# Patient Record
Sex: FEMALE | Race: WHITE | NOT HISPANIC OR LATINO | Employment: OTHER | URBAN - METROPOLITAN AREA
[De-identification: names, ages, dates, MRNs, and addresses within clinical notes are randomized per-mention and may not be internally consistent; named-entity substitution may affect disease eponyms.]

---

## 2018-04-24 ENCOUNTER — TELEPHONE (OUTPATIENT)
Dept: PEDIATRICS CLINIC | Facility: CLINIC | Age: 18
End: 2018-04-24

## 2018-04-27 RX ORDER — ACETAMINOPHEN 650 MG/1
1 SUPPOSITORY RECTAL
COMMUNITY
Start: 2015-04-27 | End: 2018-04-30 | Stop reason: SDUPTHER

## 2018-04-30 ENCOUNTER — TELEPHONE (OUTPATIENT)
Dept: INTERNAL MEDICINE CLINIC | Facility: CLINIC | Age: 18
End: 2018-04-30

## 2018-04-30 ENCOUNTER — OFFICE VISIT (OUTPATIENT)
Dept: INTERNAL MEDICINE CLINIC | Facility: CLINIC | Age: 18
End: 2018-04-30
Payer: COMMERCIAL

## 2018-04-30 VITALS — HEIGHT: 65 IN | BODY MASS INDEX: 26.15 KG/M2 | WEIGHT: 156.97 LBS

## 2018-04-30 DIAGNOSIS — Z23 NEED FOR INFLUENZA VACCINATION: Primary | ICD-10-CM

## 2018-04-30 DIAGNOSIS — Z23 NEED FOR TDAP VACCINATION: ICD-10-CM

## 2018-04-30 DIAGNOSIS — R50.83 POST-VACCINATION FEVER: ICD-10-CM

## 2018-04-30 DIAGNOSIS — J30.1 SEASONAL ALLERGIC RHINITIS DUE TO POLLEN: ICD-10-CM

## 2018-04-30 DIAGNOSIS — F84.0 AUTISTIC DISORDER: ICD-10-CM

## 2018-04-30 PROCEDURE — 3008F BODY MASS INDEX DOCD: CPT | Performed by: PHYSICIAN ASSISTANT

## 2018-04-30 PROCEDURE — 1036F TOBACCO NON-USER: CPT | Performed by: PHYSICIAN ASSISTANT

## 2018-04-30 PROCEDURE — 99214 OFFICE O/P EST MOD 30 MIN: CPT | Performed by: PHYSICIAN ASSISTANT

## 2018-04-30 PROCEDURE — 90471 IMMUNIZATION ADMIN: CPT | Performed by: PHYSICIAN ASSISTANT

## 2018-04-30 PROCEDURE — 3725F SCREEN DEPRESSION PERFORMED: CPT | Performed by: PHYSICIAN ASSISTANT

## 2018-04-30 PROCEDURE — 90715 TDAP VACCINE 7 YRS/> IM: CPT | Performed by: PHYSICIAN ASSISTANT

## 2018-04-30 RX ORDER — ACETAMINOPHEN 650 MG/1
650 SUPPOSITORY RECTAL EVERY 4 HOURS PRN
Qty: 24 SUPPOSITORY | Refills: 1 | Status: SHIPPED | OUTPATIENT
Start: 2018-04-30 | End: 2018-05-20

## 2018-04-30 NOTE — PROGRESS NOTES
Assessment/Plan:  Tdap was given today  Tylenol suppository provided for any post vaccine fever, if any fever or change in behavior go to ER for eval as seizures were fever related  [employer] forms completed and given to you at visit  Can bring in disability parking form and I will complete  As per your discussion with staff you will obtain a healthcare POA and bring to office so can be put into patients chart  No problem-specific Assessment & Plan notes found for this encounter  Diagnoses and all orders for this visit:    Need for influenza vaccination    Need for Tdap vaccination  -     TDAP VACCINE GREATER THAN OR EQUAL TO 8YO IM    Autistic disorder    Seasonal allergic rhinitis due to pollen  -     loratadine (CLARITIN REDITABS) 10 MG dissolvable tablet; Take 1 tablet (10 mg total) by mouth daily for 90 days    Post-vaccination fever  -     acetaminophen (TYLENOL) 650 mg suppository; Insert 1 suppository (650 mg total) into the rectum every 4 (four) hours as needed for moderate pain or fever for up to 20 days    Other orders  -     Discontinue: acetaminophen (TYLENOL) 650 mg suppository; Insert 1 suppository into the rectum  -     Cancel: TDAP VACCINE GREATER THAN OR EQUAL TO 8YO IM  -     Cancel: Flu vaccine greater than or equal to 4yo preservative free IM          Subjective:      Patient ID: Chuck Wong is a 19 y o  female  Pt is a 19 y o  Female who presents to the office with mom and grandma to establish care  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, softball, and cheer  The pt is also on a special needs softball league- challenger league  Patient is a full time student currently, will be through 22years old  Plan is for a day program after- they are currently on a waiting list      The patient's last sz was in 2010, she is not on any medications currently  She was on Keppra for 2 years after her last seizure   No medications since that time and she has had no further seizures  She followed up with neuro after weaning off the Keppra, they last saw her 3 years ago  Were instructed to follow up as needed or if there were any further concerns  The patient's mother is unsure of when her last Tetanus shot was  Discussed with mom that our record shows her last was in [address]  Mom is concerned about pt having a sz after shots when she gets a fever after  Mom is concerned that she needs to have suppository for fever control  She is interested the in the patient having the immunization though  Interested in getting a handicap placard- park closer to areas so that less walking in parking lots as the patient has a tendency to dart off and they would like to have less walking through the parking lot  Mom believes that the patient has seasonal allergies  She does not take any medications for her allergies  We discussed can purchase the melt away if needs allergy meds [place]    Denies asthma or any cardiac problems  Pt had some dental extractions as a child  No other surgeries  NKDA  Mother reports eats well, sleeping well most of the time  Does not follow with any MH providers  Remains active with softball  Requires assistance to dress and bath self  After extensive discussion between mother and the grandmother it was decided they wanted the Tdap for patient as well as mothers brother having a baby and daily contact as they live down the street  Multiple attempts made for blood pressure but could not be obtained  Patient did struggle at first but vaccine was given without incident    They inquired about PPD but as not required at this time not completed and would be easier for TB gold blood test          The following portions of the patient's history were reviewed and updated as appropriate: allergies, current medications, past family history, past medical history, past social history, past surgical history and problem list     Review of Systems   Constitutional: Negative for fever and unexpected weight change  HENT: Positive for rhinorrhea and sneezing  Eyes: Negative  Respiratory: Negative  Negative for cough and wheezing  Gastrointestinal: Negative for constipation, diarrhea and vomiting  Genitourinary: Negative for frequency and hematuria  Musculoskeletal: Negative for gait problem  Allergic/Immunologic: Positive for environmental allergies  Objective:      Ht 5' 5" (1 651 m)   Wt 71 2 kg (156 lb 15 5 oz)   BMI 26 12 kg/m²          Physical Exam   Constitutional: She appears well-developed and well-nourished  No distress  HENT:   Head: Normocephalic and atraumatic  Right Ear: Tympanic membrane and external ear normal    Left Ear: Tympanic membrane and external ear normal    Nose: Rhinorrhea present  Mouth/Throat: Uvula is midline, oropharynx is clear and moist and mucous membranes are normal  No trismus in the jaw  No oropharyngeal exudate or posterior oropharyngeal erythema  Mild BL cerumen without impaction  Eyes: Conjunctivae are normal  No scleral icterus  Neck: Neck supple  Cardiovascular: Normal rate, regular rhythm, S1 normal, S2 normal and normal heart sounds  Exam reveals no gallop, no distant heart sounds and no friction rub  No murmur heard  Pulmonary/Chest: Effort normal and breath sounds normal  No accessory muscle usage  No respiratory distress  Limited by patient's cooperation in taking deep breaths  Abdominal: Soft  Bowel sounds are normal  There is no tenderness  There is no guarding  Neurological: She is alert  Gait normal    Patient is non-verbal at baseline  Communicates with some sign language  Skin: Skin is warm and dry  Psychiatric: She is hyperactive  She is not aggressive and not combative  She is noncommunicative (Patient is non-verbal  Communicates with some sign language and gestures  Verbalizes sounds  )

## 2018-05-01 NOTE — TELEPHONE ENCOUNTER
This form was completed during the visit  The original was given to the mother at the end of the visit  A photocopy of this form is in the blue clinical folder and just needs to be sent for scanning

## 2018-05-02 NOTE — TELEPHONE ENCOUNTER
Left message for mom/Berta letting her know form has been completed and will be at the  for her to   Form was also sent to centralized faxing to scan into chart

## 2019-03-08 ENCOUNTER — TELEPHONE (OUTPATIENT)
Dept: INTERNAL MEDICINE CLINIC | Facility: CLINIC | Age: 19
End: 2019-03-08

## 2019-03-08 ENCOUNTER — OFFICE VISIT (OUTPATIENT)
Dept: INTERNAL MEDICINE CLINIC | Facility: CLINIC | Age: 19
End: 2019-03-08

## 2019-03-08 VITALS — BODY MASS INDEX: 27.47 KG/M2 | WEIGHT: 164.9 LBS | HEIGHT: 65 IN

## 2019-03-08 DIAGNOSIS — J30.1 SEASONAL ALLERGIC RHINITIS DUE TO POLLEN: ICD-10-CM

## 2019-03-08 DIAGNOSIS — F41.9 ANXIOUSNESS: ICD-10-CM

## 2019-03-08 DIAGNOSIS — F84.0 AUTISTIC DISORDER: Primary | ICD-10-CM

## 2019-03-08 PROBLEM — R50.83 POST-VACCINATION FEVER: Status: RESOLVED | Noted: 2018-04-30 | Resolved: 2019-03-08

## 2019-03-08 PROBLEM — Z23 NEED FOR TDAP VACCINATION: Status: RESOLVED | Noted: 2018-04-30 | Resolved: 2019-03-08

## 2019-03-08 PROCEDURE — 99214 OFFICE O/P EST MOD 30 MIN: CPT | Performed by: INTERNAL MEDICINE

## 2019-03-08 NOTE — PATIENT INSTRUCTIONS
We will fax the forms  And mail original to home  Mother will provide the fax number and location to wear the MA 51 form needs to be sent  We advised her we will attempt to send this for her by Monday March 11th we will advise her when it has been faxed and will mail the original to her  Mother is aware that she can purchase over-the-counter allergy medications should her daughter demonstrate allergy symptoms again this year  Aware to contact our office should her daughter have any recurrence of seizures for evaluation and referral to Neurology
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
## 2019-03-11 PROBLEM — F79 INTELLECTUAL DISABILITY: Status: ACTIVE | Noted: 2019-03-11

## 2019-03-11 NOTE — TELEPHONE ENCOUNTER
Form copied (placed in scanning folder), faxed (confirmation Received), original mailed to mom  Mom contacted and made aware

## 2019-03-25 ENCOUNTER — TRANSITIONAL CARE MANAGEMENT (OUTPATIENT)
Dept: INTERNAL MEDICINE CLINIC | Facility: CLINIC | Age: 19
End: 2019-03-25

## 2019-03-25 ENCOUNTER — TELEPHONE (OUTPATIENT)
Dept: INTERNAL MEDICINE CLINIC | Facility: CLINIC | Age: 19
End: 2019-03-25

## 2019-03-25 DIAGNOSIS — F84.0 AUTISTIC DISORDER: ICD-10-CM

## 2019-03-25 DIAGNOSIS — F41.9 ANXIOUSNESS: Primary | ICD-10-CM

## 2019-03-25 RX ORDER — SERTRALINE HYDROCHLORIDE 25 MG/1
25 TABLET, FILM COATED ORAL DAILY
Qty: 30 TABLET | Refills: 0 | Status: SHIPPED | OUTPATIENT
Start: 2019-03-25 | End: 2019-07-03

## 2019-03-25 NOTE — TELEPHONE ENCOUNTER
Mom called back  They really want Rosina to be seen by Suzy Verma today if possible  They will wait for a call back

## 2019-03-25 NOTE — TELEPHONE ENCOUNTER
PTS  MOM CALLED UPSET BECAUSE SHE TOOK PT  OUT SHOPPING TODAY AND SHE BECAME VERY UPSET AND OUT OF CONTROL  SHE BIT HER GRANDMOTHER AND MOM IS ASKING FOR SOME TYPE OF MEDICATION TO CALM HER DOWN AND SHE WANTS TO START IT TODAY  SHE WANTS AN APPT  WITH YOU TODAY, I ADVISED YOU DO NOT HAVE ANY AVAILABLE APPTS  BUT THAT I WOULD SPEAK TO YOU AND SEE WHAT YOU WANTED TO DO   PLEASE ADVISE    GRANDMOTHER ALSO ON THE PHONE AND SHE WAS ASKING ABOUT STARTING ON CLONIDINE

## 2019-04-21 DIAGNOSIS — F84.0 AUTISTIC DISORDER: ICD-10-CM

## 2019-04-21 DIAGNOSIS — F41.9 ANXIOUSNESS: ICD-10-CM

## 2019-04-22 RX ORDER — SERTRALINE HYDROCHLORIDE 25 MG/1
25 TABLET, FILM COATED ORAL DAILY
Qty: 30 TABLET | Refills: 0 | OUTPATIENT
Start: 2019-04-22 | End: 2019-05-22

## 2019-07-03 ENCOUNTER — OFFICE VISIT (OUTPATIENT)
Dept: INTERNAL MEDICINE CLINIC | Facility: CLINIC | Age: 19
End: 2019-07-03

## 2019-07-03 ENCOUNTER — TELEPHONE (OUTPATIENT)
Dept: INTERNAL MEDICINE CLINIC | Facility: CLINIC | Age: 19
End: 2019-07-03

## 2019-07-03 ENCOUNTER — APPOINTMENT (OUTPATIENT)
Dept: RADIOLOGY | Facility: MEDICAL CENTER | Age: 19
End: 2019-07-03
Payer: COMMERCIAL

## 2019-07-03 VITALS — HEIGHT: 65 IN | BODY MASS INDEX: 28.58 KG/M2 | WEIGHT: 171.52 LBS

## 2019-07-03 DIAGNOSIS — L98.9 SKIN LESION OF FACE: ICD-10-CM

## 2019-07-03 DIAGNOSIS — F84.0 AUTISTIC DISORDER: ICD-10-CM

## 2019-07-03 DIAGNOSIS — Z11.1 SCREENING-PULMONARY TB: ICD-10-CM

## 2019-07-03 DIAGNOSIS — F79 INTELLECTUAL DISABILITY: ICD-10-CM

## 2019-07-03 DIAGNOSIS — F41.9 ANXIOUSNESS: ICD-10-CM

## 2019-07-03 DIAGNOSIS — Z01.10 ENCOUNTER FOR HEARING EXAMINATION, UNSPECIFIED WHETHER ABNORMAL FINDINGS: ICD-10-CM

## 2019-07-03 DIAGNOSIS — Z00.00 ROUTINE ADULT HEALTH MAINTENANCE: Primary | ICD-10-CM

## 2019-07-03 PROCEDURE — 71046 X-RAY EXAM CHEST 2 VIEWS: CPT

## 2019-07-03 PROCEDURE — 99395 PREV VISIT EST AGE 18-39: CPT | Performed by: PHYSICIAN ASSISTANT

## 2019-07-03 NOTE — PROGRESS NOTES
Assessment/Plan:      Diagnoses and all orders for this visit:    Routine adult health maintenance    Intellectual disability    Autistic disorder    Anxiousness    Encounter for hearing examination, unspecified whether abnormal findings  -     Ambulatory referral to Audiology; Future    Skin lesion of face  -     Ambulatory referral to Plastic Surgery; Future    Screening-pulmonary TB  -     Cancel: TB Skin Test  -     XR chest pa & lateral; Future      49-year-old female presenting today with mom, current medical problems include autism, intellectual disability and anxiousness, here today for routine health maintenance visit and form/PPD for waiver form as mom states that she will be participating weekly in a day program     Patient's intellectual disability, autism and anxiousness were discussed today  She is currently on no medication  Mom declines psychiatry referral for anxiousness  She never took the sertraline that was prescribed back in May secondary to concern for side effects  Patient's mom states that she has been giving her CBD oil every morning on toast and has been seeming to work fairly well  At this time will continue to monitor  Physical performed with no other significant new abnormalities aside from a large skin lesion on the right side of the face near the temple anterior to the right ear  Uncertain if this is malignant or not, less likely suspect malignancy  However patient picks at it and it is scabbed and appears irritated  It is quite large, at least 1 cm in diameter  I will refer her to Plastic surgery as she may also need anesthesia if this is to be removed  Mom also concerned regarding hearing stating that she has been noticing that patient is more loud when she is seeing things or making noise  Unclear if this could be a hearing problem  I told mom I could certainly refer to audiology to assist with testing if needed      Age-appropriate education regarding screenings and prevention discussed  She has never been sexually active so I do not feel a Pap is necessary  Certainly a well-woman exam/internal exam for any woman would be important however we will defer at this time  Patient is up-to-date with vaccines, Tdap April 2018  Dental cleanings yearly at least also recommended and would need anesthesia for that  Mom declines any vision problems at this time  I do not see any need for blood work at this time  Healthy diet, regular activity recommended  PP D was attempted to be administered however patient not compliant and not allowing appropriate PPD to be placed  Mom called the waiver program and told them she can get a chest x-ray instead  I am uncertain how still she will be able to sit secondary to the autism but x-ray ordered and we will call with results  Chief Complaint   Patient presents with    Physical Exam     ANNUAL PHYSICAL EXAMINATION FORM for adult day program       Subjective:     Patient ID: Chuck Wong is a 22 y o  female  18y/o female here today with mom for physical and for waiver program      jeanna Berry has autism  Mom states she is concerned about anxiety, called before leaving for [employer] camp in [place] because it was worse  Was prescribed sertraline and mom states never started because one side effect was seizure  Hx of seizures, was on keppra but d/c'd and has remained seizure free  Mom states she has been trying CBD oil and has noticed some improvement, gives 3/4 in AM on PB and J in AM      She graduated high school - [employer]  She gets anesthesia foe dental cleanings but only had 1 dental cleaning  Mom states they use sonaacre brush twice a day  No vision correction  Mom states her vocal stemming is getting louder, so mom unsure about hearing  Also mole right ear she keeps picking at, thinks getting bigger  Mom states she is eating very well, 3 meals per day  Drinks water during day       Pt is not sexually active  LMP 6/25/19  She gets period once every 4-6 weeks  Cramping prior  Lasts 3-4 days, light bleeding  Mom states someone is with her all day  Currently at home with mother  Also under care of grandmother as needed  She will be participating in day program  Also participates in cheerleading  Review of Systems   Constitutional: Negative  HENT: Negative  Eyes: Negative  Respiratory: Negative  Cardiovascular: Negative  Gastrointestinal: Negative  Genitourinary: Negative  Musculoskeletal: Negative  Skin:        As in hpi   Neurological: Negative  Psychiatric/Behavioral:        As in HPI         The following portions of the patient's history were reviewed and updated as appropriate: allergies, current medications, past family history, past medical history, past social history, past surgical history and problem list       Objective:     Physical Exam   Constitutional: She appears well-developed  Mild overweight BMI 28 54   HENT:   Head: Normocephalic  Right Ear: Tympanic membrane, external ear and ear canal normal    Left Ear: Tympanic membrane, external ear and ear canal normal    Nose: Nose normal    Mouth/Throat: Oropharynx is clear and moist  Normal dentition  Eyes: Pupils are equal, round, and reactive to light  Conjunctivae and lids are normal    Neck: Normal range of motion  Neck supple  Normal carotid pulses present  Carotid bruit is not present  Cardiovascular: Normal rate, regular rhythm, normal heart sounds and normal pulses  Pulmonary/Chest: Effort normal and breath sounds normal    Abdominal: Soft  Normal appearance and bowel sounds are normal  There is no tenderness  Musculoskeletal:   Normal gait, gross normal movement of extremities and spine B/L   Lymphadenopathy:        Head (right side): No submandibular and no tonsillar adenopathy present  Head (left side): No submandibular and no tonsillar adenopathy present     Neurological: She is alert  She exhibits normal muscle tone  Coordination and gait normal    Skin:   Right temple just anterior to ear raised, rough textured skin lesion approx 1cm in diameter  Slightly scaly and scabbed   Psychiatric:   ID - mom historian  Pt unexpectedly loud at times, at times calm, inattentive  Does not respond to questions or communicate well  Vitals reviewed        Vitals:    07/03/19 0900   Weight: 77 8 kg (171 lb 8 3 oz)   Height: 5' 5" (1 651 m)

## 2019-07-08 NOTE — TELEPHONE ENCOUNTER
Please call pt mother - chest xray completed and negative  Copy provided with physical form  Form in red folder at nurse station  Please make copy before giving back to mother

## 2019-07-08 NOTE — TELEPHONE ENCOUNTER
Pt mom called and requested I fax over the H&P form and the chest x ray results to [phone]  This was done 3:47pm 7/8/19  Form scanned in media and mailed to patients mother

## 2019-07-08 NOTE — TELEPHONE ENCOUNTER
Called patient's mother, Fabiola gNuyen  No answer, left detailed message and to call us if she has questions

## 2019-07-09 ENCOUNTER — TELEPHONE (OUTPATIENT)
Dept: INTERNAL MEDICINE CLINIC | Facility: CLINIC | Age: 19
End: 2019-07-09

## 2019-07-09 NOTE — TELEPHONE ENCOUNTER
PTS  MOTHER CALLED ASKING FOR A LETTER STATING HER ADULT DAY PROGRAM CAN APPLY SUNSCREEN WHILE SHE IS AT THE PROGRAM  SHE NEEDS IT FAXED TO:    [address] [facility] PROGRAM: [phone]

## 2020-03-06 ENCOUNTER — TELEPHONE (OUTPATIENT)
Dept: INTERNAL MEDICINE CLINIC | Facility: CLINIC | Age: 20
End: 2020-03-06

## 2020-03-06 NOTE — TELEPHONE ENCOUNTER
Called patient mother because patient was scheduled for an physical with forms on 3/9/2020 at 9:00 am with Renay Rojas and she already have a physical exam done in 7/3/2019 with Clarke Pollock with forms too already scanned on media  Patient does not need another physical until 7/4/2020 because has to be one year plus one day  Made patient mom aware and she understood and if there is a form to be completed for physical she can just drop off the form and will take up to 10 business day to be completed and as soon is completed we will call her to inform her  Appt has been cancelled

## 2020-03-10 NOTE — TELEPHONE ENCOUNTER
Folder Color- Blue     Name of Form- General Physical Examination     Form to be filled out by- Lanette Gonsalez     Form to be Faxed ([phone] Att: Gunnar Vergara ) Mail order to mom     Patient made aware of 10 business day policy   Yes

## 2020-11-16 ENCOUNTER — TELEMEDICINE (OUTPATIENT)
Dept: INTERNAL MEDICINE CLINIC | Facility: CLINIC | Age: 20
End: 2020-11-16

## 2020-11-16 DIAGNOSIS — Z00.00 ENCOUNTER FOR ANNUAL HEALTH EXAMINATION: ICD-10-CM

## 2020-11-16 DIAGNOSIS — F84.0 AUTISTIC DISORDER: Primary | ICD-10-CM

## 2020-11-16 DIAGNOSIS — F81.89 DEVELOPMENTAL NON-VERBAL DISORDER: ICD-10-CM

## 2020-11-16 DIAGNOSIS — F79 INTELLECTUAL DISABILITY: ICD-10-CM

## 2020-11-16 PROCEDURE — 3725F SCREEN DEPRESSION PERFORMED: CPT | Performed by: PHYSICIAN ASSISTANT

## 2020-11-16 PROCEDURE — 1036F TOBACCO NON-USER: CPT | Performed by: PHYSICIAN ASSISTANT

## 2020-11-16 PROCEDURE — 99213 OFFICE O/P EST LOW 20 MIN: CPT | Performed by: PHYSICIAN ASSISTANT

## 2020-11-20 ENCOUNTER — TELEPHONE (OUTPATIENT)
Dept: INTERNAL MEDICINE CLINIC | Facility: CLINIC | Age: 20
End: 2020-11-20

## 2021-11-13 ENCOUNTER — IMMUNIZATIONS (OUTPATIENT)
Dept: FAMILY MEDICINE CLINIC | Facility: HOSPITAL | Age: 21
End: 2021-11-13

## 2021-11-13 DIAGNOSIS — Z23 ENCOUNTER FOR IMMUNIZATION: Primary | ICD-10-CM

## 2021-11-13 PROCEDURE — 91300 COVID-19 PFIZER VACC 0.3 ML: CPT

## 2021-11-13 PROCEDURE — 0001A COVID-19 PFIZER VACC 0.3 ML: CPT

## 2021-12-04 ENCOUNTER — IMMUNIZATIONS (OUTPATIENT)
Dept: FAMILY MEDICINE CLINIC | Facility: HOSPITAL | Age: 21
End: 2021-12-04

## 2021-12-04 DIAGNOSIS — Z23 ENCOUNTER FOR IMMUNIZATION: Primary | ICD-10-CM

## 2021-12-04 PROCEDURE — 91300 COVID-19 PFIZER VACC 0.3 ML: CPT

## 2021-12-04 PROCEDURE — 0002A COVID-19 PFIZER VACC 0.3 ML: CPT

## 2022-06-13 ENCOUNTER — HOSPITAL ENCOUNTER (EMERGENCY)
Facility: HOSPITAL | Age: 22
Discharge: HOME | End: 2022-06-14
Attending: EMERGENCY MEDICINE | Admitting: EMERGENCY MEDICINE
Payer: COMMERCIAL

## 2022-06-13 VITALS — WEIGHT: 171 LBS | RESPIRATION RATE: 18 BRPM | BODY MASS INDEX: 28.46 KG/M2
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
## 2022-06-13 PROCEDURE — 99282 EMERGENCY DEPT VISIT SF MDM: CPT

## 2022-06-13 PROCEDURE — 99284 EMERGENCY DEPT VISIT MOD MDM: CPT | Performed by: EMERGENCY MEDICINE

## 2022-06-14 RX ORDER — AMOXICILLIN AND CLAVULANATE POTASSIUM 875; 125 MG/1; MG/1
1 TABLET, FILM COATED ORAL ONCE
Status: COMPLETED | OUTPATIENT
Start: 2022-06-14 | End: 2022-06-14

## 2022-06-14 RX ORDER — AMOXICILLIN AND CLAVULANATE POTASSIUM 875; 125 MG/1; MG/1
1 TABLET, FILM COATED ORAL EVERY 12 HOURS SCHEDULED
Qty: 14 TABLET | Refills: 0 | Status: SHIPPED | OUTPATIENT
Start: 2022-06-14 | End: 2022-06-21

## 2022-06-14 RX ADMIN — AMOXICILLIN AND CLAVULANATE POTASSIUM 1 TABLET: 875; 125 TABLET, FILM COATED ORAL at 00:57

## 2022-06-14 NOTE — ED ATTENDING ATTESTATION
6/13/2022  I, Verba Goltz, MD, saw and evaluated the patient  I have discussed the patient with the resident/non-physician practitioner and agree with the resident's/non-physician practitioner's findings, Plan of Care, and MDM as documented in the resident's/non-physician practitioner's note, except where noted  All available labs and Radiology studies were reviewed  I was present for key portions of any procedure(s) performed by the resident/non-physician practitioner and I was immediately available to provide assistance  At this point I agree with the current assessment done in the Emergency Department  I have conducted an independent evaluation of this patient a history and physical is as follows:    ED Course         Critical Care Time  Procedures    Patient is a pleasant 22, bit by dog  This was her dog  Tdap in 2018  Dog is UTD on rabies  MDM given size of lac, will close

## 2022-06-14 NOTE — ED PROVIDER NOTES
History  Chief Complaint   Patient presents with    Dog Bite     Pt bit her mother and then dog bit patient in lower right leg area  Dog is up to date on shots, patients parents unsure when last tetanus shot was  Yokasta Castellanos is a 76-year-old female with history of intellectual disability, autism, presenting for chief complaint of dog bite  Earlier today patient was playfully interacting with grandmother when she bit her  In defense of the grandmother their dog lunged forward to bite patient  Patient kicked the dog multiple times and the dog ended up biting the patient's leg  Last tetanus was in 2018, patient and family members placed hydrogen peroxide in wound and proceeded to emergency department for evaluation  Unable to obtain vitals in triage are as patient will not allow it  None       Past Medical History:   Diagnosis Date    Epilepsy ([facility] [address] [facility]     last assessed-4/27/2015    Febrile seizure ([facility] [address] [facility]        Past Surgical History:   Procedure Laterality Date    OTHER SURGICAL HISTORY      history of previous surgery-during childhood       Family History   Problem Relation Age of Onset    No Known Problems Mother     No Known Problems Father     Heart attack Family         acute MI    Cancer Family      I have reviewed and agree with the history as documented  E-Cigarette/Vaping    E-Cigarette Use Never User      E-Cigarette/Vaping Substances    Nicotine No     THC No     CBD No     Flavoring No     Other No     Unknown No      Social History     Tobacco Use    Smoking status: Never Smoker    Smokeless tobacco: Never Used   Vaping Use    Vaping Use: Never used   Substance Use Topics    Alcohol use: Never    Drug use: Never        Review of Systems   Constitutional: Negative  HENT: Negative  Eyes: Negative  Respiratory: Negative  Cardiovascular: Negative  Gastrointestinal: Negative  Endocrine: Negative  Genitourinary: Negative  Musculoskeletal: Negative  Skin: Positive for wound  Allergic/Immunologic: Negative  Neurological: Negative  Hematological: Negative  Psychiatric/Behavioral: Negative  All other systems reviewed and are negative  Physical Exam  ED Triage Vitals [06/13/22 2310]   Temp Pulse Respirations BP SpO2   -- -- 18 -- --      Temp src Heart Rate Source Patient Position - Orthostatic VS BP Location FiO2 (%)   -- -- -- -- --      Pain Score       --             Orthostatic Vital Signs  There were no vitals filed for this visit  Physical Exam  Nursing note reviewed  Constitutional:       General: She is not in acute distress  Appearance: Normal appearance  She is not ill-appearing, toxic-appearing or diaphoretic  HENT:      Head: Normocephalic and atraumatic  Eyes:      General: No scleral icterus  Right eye: No discharge  Left eye: No discharge  Extraocular Movements: Extraocular movements intact  Conjunctiva/sclera: Conjunctivae normal       Pupils: Pupils are equal, round, and reactive to light  Cardiovascular:      Heart sounds: Normal heart sounds  Pulmonary:      Effort: Pulmonary effort is normal  No respiratory distress  Breath sounds: Normal breath sounds  No stridor  No wheezing, rhonchi or rales  Musculoskeletal:         General: No swelling  Normal range of motion  Cervical back: Normal range of motion  No rigidity  Right lower leg: No edema  Left lower leg: No edema  Skin:     General: Skin is warm and dry  Capillary Refill: Capillary refill takes less than 2 seconds  Coloration: Skin is not jaundiced  Findings: No bruising or lesion  Neurological:      Mental Status: She is alert and oriented to person, place, and time  Mental status is at baseline  Psychiatric:         Mood and Affect: Mood normal          Behavior: Behavior normal          Thought Content:  Thought content normal          Judgment: Judgment normal          ED Medications  Medications   amoxicillin-clavulanate (AUGMENTIN) 875-125 mg per tablet 1 tablet (1 tablet Oral Given 6/14/22 0057)       Diagnostic Studies  Results Reviewed     None                 No orders to display         Procedures  Laceration repair    Date/Time: 6/14/2022 12:20 AM  Performed by: Roselyn Harley DO  Authorized by: Roselyn Harley DO   Consent: Verbal consent obtained  Risks and benefits: risks, benefits and alternatives were discussed  Consent given by: patient  Time out: Immediately prior to procedure a "time out" was called to verify the correct patient, procedure, equipment, support staff and site/side marked as required  Body area: lower extremity  Location details: left ankle  Laceration length: 3 cm      Procedure Details:  Irrigation solution: saline  Amount of cleaning: extensive  Skin closure: Steri-Strips  Approximation: loose  Approximation difficulty: simple  Patient tolerance: patient tolerated the procedure well with no immediate complications  Comments: 6x steristrip            ED Course                                       MDM  Number of Diagnoses or Management Options  Dog bite, initial encounter: new and does not require workup  Diagnosis management comments: -patient presenting for evaluation of dog bite  -physical exam significant for laceration behind right calf  -closed with 6 Steri-Strips   -given patient in lateral disability, very difficult to have patient take p o  medication  I checked with pharmacy who stated that Augmentin could be crushed up and sprinkled over food  I gave 1st dose of Augmentin here successfully  A prescription written for Augmentin outpatient   -patient given return precautions, instructions for primary care follow-up, discharged emergency department         Amount and/or Complexity of Data Reviewed  Decide to obtain previous medical records or to obtain history from someone other than the patient: yes  Review and summarize past medical records: yes  Discuss the patient with other providers: yes  Independent visualization of images, tracings, or specimens: yes        Disposition  Final diagnoses:   Dog bite, initial encounter     Time reflects when diagnosis was documented in both MDM as applicable and the Disposition within this note     Time User Action Codes Description Comment    6/14/2022 12:23 AM Mara Perez Add Lion Blake  0XXA] Dog bite, initial encounter       ED Disposition     ED Disposition   Discharge    Condition   Stable    Date/Time   Tue Jun 14, 2022 12:23 AM    Comment   Abby Bolden discharge to home/self care  Follow-up Information     Follow up With Specialties Details Why Contact Info Additional Information    [facility] Medicine Call in 2 days  [address] [id number]  [id number]  , [place], [place], [address]          Discharge Medication List as of 6/14/2022  1:18 AM      START taking these medications    Details   amoxicillin-clavulanate (AUGMENTIN) 875-125 mg per tablet Take 1 tablet by mouth every 12 (twelve) hours for 7 days Crush tablet and administer immediately with food  **DO NOT DISPENSE EXTENDED RELEASE**, Starting Tue 6/14/2022, Until Tue 6/21/2022, Normal           No discharge procedures on file  PDMP Review     None           ED Provider  Attending physically available and evaluated Abby Bolden I managed the patient along with the ED Attending      Electronically Signed by         Blu Campos,   06/14/22 0442

## 2022-06-14 NOTE — DISCHARGE INSTRUCTIONS
Return to the emergency department if:   You have a fever  Your wound is red, swollen, and draining pus  You see red streaks on the skin around the wound  You can no longer move the bitten area  Your heartbeat and breathing are much faster than usual     You feel dizzy and confused

## 2022-06-20 ENCOUNTER — HOSPITAL ENCOUNTER (EMERGENCY)
Facility: HOSPITAL | Age: 22
Discharge: HOME | End: 2022-06-20
Attending: EMERGENCY MEDICINE | Admitting: EMERGENCY MEDICINE
Payer: COMMERCIAL

## 2022-06-20 DIAGNOSIS — Z51.89 VISIT FOR WOUND CHECK: Primary | ICD-10-CM

## 2022-06-20 PROCEDURE — 99282 EMERGENCY DEPT VISIT SF MDM: CPT

## 2022-06-20 PROCEDURE — 99281 EMR DPT VST MAYX REQ PHY/QHP: CPT | Performed by: EMERGENCY MEDICINE

## 2022-06-21 NOTE — ED NOTES
Family refusing vitals at this time as patient gets overstimulated due to autism        Corey Hernández RN  06/20/22 6975

## 2022-06-21 NOTE — ED NOTES
Pt was seen, assessed, and discharged by Dr Geovanny Espinosa  Pt family refused vitals due to Pt getting really worked up when strangers touch her        Cecile Hicks RN  06/20/22 2865

## 2022-06-21 NOTE — ED ATTENDING ATTESTATION
6/20/2022  IAlondra MD, saw and evaluated the patient  I have discussed the patient with the resident/non-physician practitioner and agree with the resident's/non-physician practitioner's findings, Plan of Care, and MDM as documented in the resident's/non-physician practitioner's note, except where noted  All available labs and Radiology studies were reviewed  I was present for key portions of any procedure(s) performed by the resident/non-physician practitioner and I was immediately available to provide assistance  At this point I agree with the current assessment done in the Emergency Department  I have conducted an independent evaluation of this patient a history and physical is as follows:  Dog bite improving on antibiotics, recommended local wound care  Patient well appearing, no additional concerns in the emergency department  Discharged in stable condition      ED Course         Critical Care Time  Procedures

## 2022-06-21 NOTE — ED PROVIDER NOTES
History  Chief Complaint   Patient presents with    Wound Check     Patient seen for evaluation of dog bite  Parent states wound is looking better, but wants to have provider asha       The patient is a 56-year-old female with past medical history of autism and dog bite who is presenting to the emergency department for wound check  The patient reports that she received a dog bite approximately 5 days ago and reported to the emergency department at that time where she received Steri-Strips and Augmentin before discharge  Patient's mother is at bedside she reports that they have had no complaints but were concerned that the wound may be getting reinfected and just wanted to have it evaluated before their antibiotic runs out in case they need a new prescription  They have no complaints at this time and reports the wound has remained clean and denies any purulent drainage, erythema, edema or fever  She also reports that her daughter has not behaved in a manner that would lead her to believe that the wound is tender  Prior to Admission Medications   Prescriptions Last Dose Informant Patient Reported? Taking?   amoxicillin-clavulanate (AUGMENTIN) 875-125 mg per tablet   No No   Sig: Take 1 tablet by mouth every 12 (twelve) hours for 7 days Crush tablet and administer immediately with food  **DO NOT DISPENSE EXTENDED RELEASE**      Facility-Administered Medications: None       Past Medical History:   Diagnosis Date    Epilepsy ([facility] [address] )     last assessed-4/27/2015    [facility]        Past Surgical History:   Procedure Laterality Date    OTHER SURGICAL HISTORY      history of previous surgery-during childhood       Family History   Problem Relation Age of Onset    No Known Problems Mother     No Known Problems Father     Heart attack Family         acute MI    Cancer Family      I have reviewed and agree with the history as documented      E-Cigarette/Vaping    E-Cigarette Use Never User E-Cigarette/Vaping Substances    Nicotine No     THC No     CBD No     Flavoring No     Other No     Unknown No      Social History     Tobacco Use    Smoking status: Never Smoker    Smokeless tobacco: Never Used   Vaping Use    Vaping Use: Never used   Substance Use Topics    Alcohol use: Never    Drug use: Never        Review of Systems   Constitutional: Negative for chills and fever  HENT: Negative for ear pain and sore throat  Eyes: Negative for pain and visual disturbance  Respiratory: Negative for cough and shortness of breath  Cardiovascular: Negative for chest pain and palpitations  Gastrointestinal: Negative for abdominal pain and vomiting  Endocrine: Negative  Genitourinary: Negative for dysuria and hematuria  Musculoskeletal: Negative for arthralgias and back pain  Skin: Positive for wound  Negative for color change and rash  2 cm laceration secondary to dog bite that was sustained 5 days ago  Allergic/Immunologic: Negative  Neurological: Negative for seizures and syncope  Hematological: Negative  Psychiatric/Behavioral: Negative  All other systems reviewed and are negative  Physical Exam  ED Triage Vitals   Temp Pulse Resp BP SpO2   -- -- -- -- --      Temp src Heart Rate Source Patient Position - Orthostatic VS BP Location FiO2 (%)   -- -- -- -- --      Pain Score       --             Orthostatic Vital Signs  There were no vitals filed for this visit  Physical Exam  Vitals and nursing note reviewed  Constitutional:       General: She is not in acute distress  Appearance: Normal appearance  She is well-developed  She is not ill-appearing  HENT:      Head: Normocephalic and atraumatic  Nose: Nose normal    Eyes:      Extraocular Movements: Extraocular movements intact  Conjunctiva/sclera: Conjunctivae normal       Pupils: Pupils are equal, round, and reactive to light     Cardiovascular:      Rate and Rhythm: Normal rate and regular rhythm  Pulses: Normal pulses  Heart sounds: Normal heart sounds  No murmur heard  Pulmonary:      Effort: Pulmonary effort is normal  No respiratory distress  Breath sounds: Normal breath sounds  Abdominal:      Palpations: Abdomen is soft  Tenderness: There is no abdominal tenderness  Musculoskeletal:         General: No swelling or tenderness  Normal range of motion  Cervical back: Normal range of motion and neck supple  Right lower leg: No edema  Left lower leg: No edema  Skin:     General: Skin is warm and dry  Capillary Refill: Capillary refill takes less than 2 seconds  Coloration: Skin is not jaundiced  Findings: No bruising, erythema or rash  Comments: 2 cm laceration of the right lateral calf  There is no indication of erythema, edema or purulent drainage  The wound is not warm to palpation  No obvious signs of infection  Neurological:      General: No focal deficit present  Mental Status: She is alert  Mental status is at baseline  Cranial Nerves: No cranial nerve deficit  Sensory: No sensory deficit  Motor: No weakness  ED Medications  Medications - No data to display    Diagnostic Studies  Results Reviewed     None                 No orders to display         Procedures  Procedures      ED Course  ED Course as of 06/20/22 2311 Mon Jun 20, 2022 2311 Patient was on cooperative for vitals assessment and her caretakers requested that we refrain from checking them so as not to upset her  The patient had an unremarkable physical exam and did not appear to be unwell according to her caretakers  MDM  Number of Diagnoses or Management Options  Visit for wound check  Diagnosis management comments: The patient is a 78-year-old female with past medical history of autism and dog bite who is presenting to the emergency department for wound check    Upon initial presentation the patient was alert and pleasant while sitting in her hallway bed  With some assistance from her parents I was able to evaluate the wound  There were no obvious signs of infection  There is no purulent drainage, erythema, edema or warmth to palpation of the wound site  Due to the patient's autism she was not overly cooperative with the exam and would not allow nursing staff to check vitals  Her caretakers requested that we refrain from checking her vitals at this time but reported that she was at baseline and did not act as though she was feeling unwell  I have no concern for infection at this time  The parents were instructed to continue giving antibiotic as prescribed and to apply Neosporin daily to the wound site while keeping it clean  Return precautions were discussed and the patient will be discharged at this time  Disposition  Final diagnoses:   Visit for wound check     Time reflects when diagnosis was documented in both MDM as applicable and the Disposition within this note     Time User Action Codes Description Comment    6/20/2022 10:58 PM Segundo Amos [[id number] [id number]] Visit for wound check       ED Disposition     ED Disposition   Discharge    Condition   Stable    Date/Time   Mon Jun 20, 2022 11:00 PM    Comment   Vida Safia discharge to home/self care  Follow-up Information     Follow up With Specialties Details Why Contact Info Additional Information    [facility] Emergency Department Emergency Medicine  As needed [address] [facility] [id number] [place] [address] Emergency Department,  [address], [place], [place], [address]          Patient's Medications   Discharge Prescriptions    No medications on file     No discharge procedures on file  PDMP Review     None           ED Provider  Attending physically available and evaluated Vida Baig   I managed the patient along with the ED Attending      Electronically Signed by         Patti Dawkins,   06/20/22 1251

## 2022-06-21 NOTE — DISCHARGE INSTRUCTIONS
Your dog bite has been evaluated in the emergency department  There are no signs of infection, purulent drainage, redness or swelling  Your afebrile at triage  You have been encouraged to continue taking her Augmentin as prescribed and to apply Neosporin daily to the wound and to keep it clean  Please return to the emergency department should you develop fever, purulent drainage from the wound site, redness, swelling or any other symptoms that you find concerning

## 2022-10-15 ENCOUNTER — NURSE TRIAGE (OUTPATIENT)
Dept: OTHER | Facility: OTHER | Age: 22
End: 2022-10-15

## 2022-10-15 NOTE — TELEPHONE ENCOUNTER
Mom stated problem has been occurring intermittently for over a week  Patient has not been seen in office since 2020  Mom did not endorse new or worsening symptoms today  Advised mom call office Monday to make an appointment or if she feels patient is worse today to go to the ER for evaluation  Mom verbalized agreement with plan  Reason for Disposition  • Nursing judgment or information in reference    Answer Assessment - Initial Assessment Questions  1  REASON FOR CALL: "What is your main concern right now?"      Shes getting very aggressive, non verbal, screaming and yelling  Feel like its anxiety  2  ONSET: "When did the behavior start?"      Going on for longer than a week  3   SEVERITY: "How bad is the behavior?"      Hitting peiople, aggressive    Protocols used: NO GUIDELINE AVAILABLE-ADULT-

## 2022-10-15 NOTE — TELEPHONE ENCOUNTER
Regarding: very angry, hit and punch  ----- Message from [facility] sent at 10/15/2022  8:16 AM EDT -----  "My daughter is non verbal autistic  She is getting very angry, hitting and punch  She get's upset easily    I need something to calm her down "

## 2022-10-19 NOTE — TELEPHONE ENCOUNTER
Called to speak to patients mom Maximino Mark, she advised she is currently at work and is unable to talk  She advised she will call back in the morning 10/20/22

## 2022-12-27 ENCOUNTER — OFFICE VISIT (OUTPATIENT)
Dept: INTERNAL MEDICINE CLINIC | Facility: CLINIC | Age: 22
End: 2022-12-27

## 2022-12-27 VITALS — BODY MASS INDEX: 28.29 KG/M2 | WEIGHT: 170 LBS | TEMPERATURE: 98 F

## 2022-12-27 DIAGNOSIS — L03.011 PARONYCHIA OF RIGHT RING FINGER: Primary | ICD-10-CM

## 2022-12-27 DIAGNOSIS — F84.0 AUTISTIC DISORDER: ICD-10-CM

## 2022-12-27 PROBLEM — F41.9 ANXIOUSNESS: Status: RESOLVED | Noted: 2019-03-08 | Resolved: 2022-12-27

## 2022-12-27 RX ORDER — SULFAMETHOXAZOLE AND TRIMETHOPRIM 200; 40 MG/5ML; MG/5ML
20 SUSPENSION ORAL 2 TIMES DAILY
Qty: 400 ML | Refills: 0 | Status: SHIPPED | OUTPATIENT
Start: 2022-12-27 | End: 2023-01-05

## 2022-12-28 ENCOUNTER — TELEPHONE (OUTPATIENT)
Dept: INTERNAL MEDICINE CLINIC | Facility: CLINIC | Age: 22
End: 2022-12-28

## 2022-12-28 ENCOUNTER — PATIENT OUTREACH (OUTPATIENT)
Dept: INTERNAL MEDICINE CLINIC | Facility: CLINIC | Age: 22
End: 2022-12-28

## 2022-12-28 NOTE — TELEPHONE ENCOUNTER
Please schedule physical with patient's PCP     Was seen 12/27/22    Left message in VM to schedule physical

## 2022-12-28 NOTE — PROGRESS NOTES
Name: Bina Curtis      : 2000      MRN: [id number]  Encounter Provider: Hermilo Pompa PA-C  Encounter Date: 2022   Encounter department: [address] [place] [address]    Assessment & Plan     1  Paronychia of right ring finger  Assessment & Plan:  Difficult to exam fully due to patient not fully cooperating  Discussed paronychia  Possibly needs drainage, but as patient was uncooperative in the office, will try supportive treatment  Warm water soaks and warm compresses  Start Bactrim  Recommend probiotic  ER precautions given for worsening symptoms and signs that it may need I&D  Mom and Dad understood and agreeable to plan  Follow up here as needed  Orders:  -     sulfamethoxazole-trimethoprim (BACTRIM) 200-40 mg/5 mL suspension; Take 20 mL (160 mg of trimethoprim total) by mouth 2 (two) times a day for 10 days    2  Autistic disorder  Assessment & Plan:  Recommend seeing a specialist and behavioral therapist  Given BH hand out and referral to SW to see what assistance may be available  Orders:  -     Ambulatory Referral to Social Work Care Management Program; Future         Subjective      Patient is a 23year old female with a PMH of autism presenting for a same day  She is here with her Mom and Dad who are her full time caregivers  Mom states yesterday she noticed her right ring finger was red around the nail  Today it is very swollen and seems to be bothering her  She does not communicate  Denies fever or chills  Denies vomiting  She is acting normal otherwise - at baseline  Mom would also like her to start a medication  Mom states she has been gradually becoming aggressive  States she used to go to a program, but Mom stopped taking her when COVID started  States she also used to be on medications several years ago for seizures, but she was weaned off  She has not had a seizure in many years     Mom also states her chiropractor prescribes her CBD products that do have THC but in minimal amounts  Review of Systems   Constitutional: Negative for chills and fever  Gastrointestinal: Negative for diarrhea and vomiting  Skin: Positive for rash  No current outpatient medications on file prior to visit  Objective     Temp 98 °F (36 7 °C)   Wt 77 1 kg (170 lb)   BMI 28 29 kg/m²     Physical Exam  Vitals and nursing note reviewed  Constitutional:       General: She is awake  Appearance: Normal appearance  She is obese  HENT:      Head: Normocephalic and atraumatic  Eyes:      General: No scleral icterus  Conjunctiva/sclera: Conjunctivae normal    Musculoskeletal:         General: Normal range of motion  Skin:     General: Skin is warm  Coloration: Skin is not jaundiced  Findings: Rash present  Comments: Erythema of right distal ring finger with small area of fluctuance, appears tender to the touch  No warmth  Neurological:      Mental Status: She is alert  Mental status is at baseline  Gait: Gait is intact  Psychiatric:         Attention and Perception: She is inattentive  Speech: She is noncommunicative  Behavior: Behavior is uncooperative         Karyn Oviedo PA-C

## 2022-12-28 NOTE — ASSESSMENT & PLAN NOTE
Recommend seeing a specialist and behavioral therapist  Given BH hand out and referral to SW to see what assistance may be available

## 2022-12-28 NOTE — PROGRESS NOTES
SW has attempted to reach pt's Mother Hilda Velasco to assist with OP MH resources  Per chart review pt is non verbal autistic and has been recently dealing with more aggressive behavior ie  Angry, hitting, punching  Pt has a hx of Intellectual disability ,Autistic disorder & Anxiousness  Pt's parents are her full time care givers  PAUL left message for Mother to return SW call  SW to f/u and assist as indicated

## 2022-12-29 ENCOUNTER — PATIENT OUTREACH (OUTPATIENT)
Dept: INTERNAL MEDICINE CLINIC | Facility: CLINIC | Age: 22
End: 2022-12-29

## 2022-12-29 DIAGNOSIS — F84.0 AUTISTIC DISORDER: Primary | ICD-10-CM

## 2022-12-29 DIAGNOSIS — F79 INTELLECTUAL DISABILITY: ICD-10-CM

## 2022-12-29 NOTE — PROGRESS NOTES
PAUL has spoke with pt's  Mother and she relates that they are seeking psychiatric assistance to help their dgt who is Autistic and having increased aggressive behaviors  She is concerned as pt has hx of seizures so they are hoping there can be medication available for perhaps PRN needs    Pt requires 24 hour care and supervision  She needs help with all of her ADLs and is NOT safety conscious  Pt is non verbal but does use sign language to make her needs known  Her Mother relates she has comprehension of a 1 y/o  Family follow a pretty strict routine with pt as to manage her care and anxiety       They do take her out into the community for family outings  She had been in an [facility] but with safety restrictions it was too difficult to continue  Family feels she is safer and more content with them managing her care   She resides with her parents and siblings and  is on the ID/MH Waiver  Pt's Mother and her Grandmother are her paid caregivers  Her  through [facility] is Mariaelena Ann [phone]  Pt's Mother is receptive to referral to [facility] for  Medical management  She does not feel pt can participate in talk therapy  She is aware their is an extensive waiting list     Mother feels more comfortable staying with the hospital system as she is fearful of any negative out comes ie seizures due to medications    PAUL has also offered referrals to a few other places which maybe sooner  PAUL to mail her the list and has made some suggestion to try  She will explore these if needed  PAUL also suggested she speak with pt's  as she may have additional resources  Mother relates she will and will let SW know when / where pt gets an appointment or if she needs additional help      PAUL has placed an [employer] request to [facility] and has mailed out a list of OP [address] providers to Mother along with SW contact info

## 2022-12-30 ENCOUNTER — TELEPHONE (OUTPATIENT)
Dept: PSYCHIATRY | Facility: CLINIC | Age: 22
End: 2022-12-30

## 2022-12-30 NOTE — TELEPHONE ENCOUNTER
Called patient regarding message received for services   LVM for patient or guardian to contact intake dept to discuss referral

## 2022-12-30 NOTE — TELEPHONE ENCOUNTER
Contacted Patient in regards to routine referral and placing patient on proper wait list  lvm for patient to contact intake department

## 2023-01-04 ENCOUNTER — NURSE TRIAGE (OUTPATIENT)
Dept: OTHER | Facility: OTHER | Age: 23
End: 2023-01-04

## 2023-01-05 NOTE — TELEPHONE ENCOUNTER
Reason for Disposition  • Taking new prescription antibiotic (Exception: finished taking new prescription antibiotic)    Answer Assessment - Initial Assessment Questions  1  APPEARANCE of RASH: "Describe the rash " (e g , spots, blisters, raised areas, skin peeling, scaly)      Red and blotchy and raised and red pimply looking    2  SIZE: "How big are the spots?" (e g , tip of pen, eraser, coin; inches, centimeters)      Hard for mom to describe- "patches of rash"     3  LOCATION: "Where is the rash located?"      Chest, abd, back    4  COLOR: "What color is the rash?" (Note: It is difficult to assess rash color in people with darker-colored skin  When this situation occurs, simply ask the caller to describe what they see )      Red     5  ONSET: "When did the rash begin?"      This evening    6  FEVER: "Do you have a fever?" If Yes, ask: "What is your temperature, how was it measured, and when did it start?"      Denies        7  ITCHING: "Does the rash itch?" If Yes, ask: "How bad is the itch?" (Scale 1-10; or mild, moderate, severe)      Denies     8  CAUSE: "What do you think is causing the rash?"      ? Bactrim     9  NEW MEDICATION: "What new medication are you taking?" (e g , name of antibiotic) "When did you start taking this medication?"  Bactrim started on 12/27/22 for paronychia  10  OTHER SYMPTOMS: "Do you have any other symptoms?" (e g , sore throat, fever, joint pain)       Denies     11  PREGNANCY: "Is there any chance you are pregnant?" "When was your last menstrual period?"        Mother denies    No face, tongue, lip swelling, dyspnea  Paronychia has improved but mom reports that it is not gone and is still red and swollen  She is doing everything she was told to do to care for it      Protocols used: RASH - WIDESPREAD ON DRUGS-ADULT-

## 2023-01-05 NOTE — TELEPHONE ENCOUNTER
Regarding: Rash is spreading all over daughter body and is getting worst  ----- Message from Tracey Aguila sent at 1/4/2023 11:15 PM EST -----  '' I had called earlier regarding my daughter being on  antibiotics and has broken out in rash, now the rash is spreading all over her body and is getting worst ''

## 2023-01-05 NOTE — TELEPHONE ENCOUNTER
Called Patient, Mom answer and stated patient is non-verbal,inform mom at this time we do not specialize in non-verbal and call mail out additional resources   Mail additonal resources

## 2023-01-05 NOTE — TELEPHONE ENCOUNTER
Patients mother Sarbjit Silverio called back  She stated that she started the ABX (Bactrim) on 12/27/22 and was fine until last night 1/4/23 when she developed a rash all over  She gave her Benadryl and Tylenol since her temp was 99 0 and she seems fine today  Her rash is gone except for a little bit of redness but mom said overall she is better  She would like to know if she should continue to give her the remainder of the Bactrim  She should have roughly one more day left   Please advise

## 2023-01-05 NOTE — TELEPHONE ENCOUNTER
Reason for Disposition  • Taking new prescription antibiotic (Exception: finished taking new prescription antibiotic)    Answer Assessment - Initial Assessment Questions  1  APPEARANCE of RASH: "Describe the rash " (e g , spots, blisters, raised areas, skin peeling, scaly)      Looks like sun poisioning, blotchy  2  SIZE: "How big are the spots?" (e g , tip of pen, eraser, coin; inches, centimeters)      All over back  3  LOCATION: "Where is the rash located?"      Chest, arms, neck and face  4  COLOR: "What color is the rash?" (Note: It is difficult to assess rash color in people with darker-colored skin  When this situation occurs, simply ask the caller to describe what they see )      Red  5  ONSET: "When did the rash begin?"      Tonight  6  FEVER: "Do you have a fever?" If Yes, ask: "What is your temperature, how was it measured, and when did it start?"      Denies  7  ITCHING: "Does the rash itch?" If Yes, ask: "How bad is the itch?" (Scale 1-10; or mild, moderate, severe)      Severe  8  CAUSE: "What do you think is causing the rash?"      Bactrim  9  NEW MEDICATION: "What new medication are you taking?" (e g , name of antibiotic) "When did you start taking this medication?"  Bactrim  10   OTHER SYMPTOMS: "Do you have any other symptoms?" (e g , sore throat, fever, joint pain)        Denies lip or tongue swelling; denies difficulty breathing    Protocols used: [facility]

## 2023-01-05 NOTE — TELEPHONE ENCOUNTER
Regarding: antibiotics/ rash on her body  ----- Message from Catherine Valdez sent at 1/4/2023 10:02 PM EST -----  " She is on antibiotics and now she has broken out in a rash on body  "

## 2023-01-10 NOTE — TELEPHONE ENCOUNTER
I spoke with patients mom, she said that she is doing better  She ended up giving her the Benadryl and she didn't give her any more doses of the ABX after I spoke with her  She stated it is still a little red but she said she will hold off for a few more days to see if it improves  If not she will bring her in for an appt

## 2023-01-25 ENCOUNTER — VBI (OUTPATIENT)
Dept: ADMINISTRATIVE | Facility: OTHER | Age: 23
End: 2023-01-25

## 2025-03-13 NOTE — TELEPHONE ENCOUNTER
Detail Level: Detailed Patient called back and provided fax number    Govind Ware   Fax # [phone] [employer] [facility]: Preventive Care And Screening: Tobacco Use: Screening And Cessation Intervention: Patient screened for tobacco use and is an ex/non-smoker